# Patient Record
Sex: MALE | Race: WHITE
[De-identification: names, ages, dates, MRNs, and addresses within clinical notes are randomized per-mention and may not be internally consistent; named-entity substitution may affect disease eponyms.]

---

## 2017-06-29 NOTE — OR
DATE:  06/29/2017

 

PROCEDURE:  Total colonoscopy.

 

INSTRUMENT USED:  CF-H180AL Olympus video colonoscope.

 

PREMEDICATIONS:  Fentanyl 150 mcg intravenous, Versed 4 mg intravenous.  Nasal

O2 cannula.  The procedure was done under pulse oximetry, BP recording, and

cardiac monitor.

 

INDICATION:  Screening colonoscopic examination is done for detection of any

polypoid lesions and removal, endoscopic hemostasis therapy if needed.

 

DESCRIPTION OF PROCEDURE:  Initial rectal exam was unremarkable.  Rigid anoscopy

was normal.  The colonoscope was passed with ease up to the ileocecal area,

photographs were taken of the normal-appearing cecum, identified by double-

bulged ileocecal folds.  No bleeding was noted from any of the visualized areas

at the commencement of the examination.  No stricture.  No vascular ectasia.  No

large isolated ulcerations seen.  No evidence of diffuse inflammatory bowel

disease in the form of friability, contact bleeding, or ulcerations.  No polyp

or tumor mass identified.  Probing the proximal sides of folds and flexures,

using adequate distention and clearing up the stool material, withdrawal of the

scope was made, cecum to rectum time over 6 minutes.  No bleeding was noted from

any of the visualized areas at the completion of examination.

 

IMPRESSION:  Normal study.

 

The patient tolerated the procedure well.

 

DD:  06/29/2017 08:23:30

DT:  06/29/2017 09:12:18

[facility]

Job #:  [id number]

## 2020-01-06 ENCOUNTER — HOSPITAL ENCOUNTER (EMERGENCY)
Dept: HOSPITAL 43 - DL.ED | Age: 60
Discharge: HOME | End: 2020-01-06
Payer: SELF-PAY

## 2020-01-06 VITALS — DIASTOLIC BLOOD PRESSURE: 77 MMHG | HEART RATE: 87 BPM | SYSTOLIC BLOOD PRESSURE: 148 MMHG

## 2020-01-06 DIAGNOSIS — Z91.09: ICD-10-CM

## 2020-01-06 DIAGNOSIS — I10: ICD-10-CM

## 2020-01-06 DIAGNOSIS — E66.9: ICD-10-CM

## 2020-01-06 DIAGNOSIS — E03.9: ICD-10-CM

## 2020-01-06 DIAGNOSIS — Z79.899: ICD-10-CM

## 2020-01-06 DIAGNOSIS — F17.210: ICD-10-CM

## 2020-01-06 DIAGNOSIS — B34.9: ICD-10-CM

## 2020-01-06 DIAGNOSIS — R07.89: Primary | ICD-10-CM

## 2020-01-06 LAB
ANION GAP SERPL CALC-SCNC: 14.8 MMOL/L
CHLORIDE SERPL-SCNC: 98 MMOL/L (ref 101–111)
SODIUM SERPL-SCNC: 135 MMOL/L (ref 135–145)

## 2020-01-06 NOTE — CR
EXAMINATION: Chest 1V Frontal  SEX: Male   AGE: 59 years

 

CLINICAL HISTORY: 59-year-old male complaining of CHEST PAIN.

 

 

INTERPRETATION: External cardiac monitor leads.

 

1.  Normal cardiac silhouette. 

2.  No pulmonary vascular congestion, cephalization of vascular flow, alveolar

edema or dependent pleural effusion.

 

3.  No lung mass, hilar lymphadenopathy or focal lobar pneumonia.

 

4.  No pneumothorax or pneumomediastinum.

 

CONCLUSION:

  No sign of heart failure, lobar pneumonia or other abnormality.

## 2020-01-06 NOTE — EDM.PDOC
ED HPI GENERAL MEDICAL PROBLEM





- General


Chief Complaint: Chest Pain


Stated Complaint: CHEST FEELS HEAVY/LIGHTHEADED


Time Seen by Provider: 01/06/20 13:26


Source of Information: Reports: Patient, RN, RN Notes Reviewed


History Limitations: Reports: No Limitations





- History of Present Illness


INITIAL COMMENTS - FREE TEXT/NARRATIVE: 


Pt presents to ER with c/o lightheadedness that began at 1130HRS today. Pt 

states he hasn't felt well in general for about 3 or 4 days with "flu-like" 

symptoms. Pt admits to a heaviness in his chest for the past 3 days. Denies 

cough, shortness of breath, edema, or palpitations. Denies any Hx of CAD/MI.





Onset: Gradual


Duration: Constant, Resolved Prior to Arrival


Location: Reports: Chest, Generalized


Quality: Reports: Ache


Severity: Moderate


Improves with: Reports: None


Worsens with: Reports: None


Associated Symptoms: Reports: No Other Symptoms





- Related Data


 Allergies











Allergy/AdvReac Type Severity Reaction Status Date / Time


 


bee sting Allergy  ANAPHYLAXIS Uncoded 01/06/20 13:13











Home Meds: 


 Home Meds





Chlorthalidone 1 tab PO DAILY 06/28/17 [History]


Levothyroxine Sodium 1 tab PO DAILY 06/28/17 [History]


amLODIPine Besylate [Amlodipine Besylate] 1 tab PO DAILY 06/28/17 [History]


cloNIDine HCL [Clonidine HCl] 1 tab PO DAILY 06/28/17 [History]


Potassium Chloride [Klor-Con 10] 10 meq PO DAILY 01/06/20 [History]











Past Medical History


HEENT History: Reports: Impaired Vision, Other (See Below)


Other HEENT History: WEARS CORRECTIVE LENS


Cardiovascular History: Reports: Hypertension


Respiratory History: Reports: None


Gastrointestinal History: Reports: None


Genitourinary History: Reports: Other (See Below)


Other Genitourinary History: MICROSCOPIC HEMATURIA


Musculoskeletal History: Reports: None


Neurological History: Reports: None


Psychiatric History: Reports: None


Endocrine/Metabolic History: Reports: Hypothyroidism, Obesity/BMI 30+


Hematologic History: Reports: None


Immunologic History: Reports: None


Oncologic (Cancer) History: Reports: None


Dermatologic History: Reports: Other (See Below)


Other Dermatologic History: sun allergy...breaks out in the sun. on arms





- Infectious Disease History


Infectious Disease History: Reports: Chicken Pox, Measles, Mumps





- Past Surgical History


Male  Surgical History: Reports: Other (See Below)





Social & Family History





- Family History


Family Medical History: Noncontributory





- Tobacco Use


Smoking Status *Q: Heavy Tobacco Smoker


Years of Tobacco use: 39


Packs/Tins Daily: 1





- Caffeine Use


Caffeine Use: Reports: Coffee





- Alcohol Use


Days Per Week of Alcohol Use: 2


Number of Drinks Per Day: 5


Total Drinks Per Week: 10





- Recreational Drug Use


Recreational Drug Use: No





- Living Situation & Occupation


Occupation: Employed





ED ROS GENERAL





- Review of Systems


Review Of Systems: Comprehensive ROS is negative, except as noted in HPI.





ED EXAM, GENERAL





- Physical Exam


Exam: See Below


Exam Limited By: No Limitations


General Appearance: Alert, WD/WN, No Apparent Distress


Eye Exam: Bilateral Eye: Normal Inspection


Nose: Normal Inspection, Normal Mucosa, No Blood


Throat/Mouth: Normal Inspection, Normal Lips, Normal Teeth, Normal Gums, Normal 

Oropharynx, Normal Voice, No Airway Compromise


Head: Atraumatic, Normocephalic


Neck: Normal Inspection, Supple, Non-Tender, Full Range of Motion


Respiratory/Chest: No Respiratory Distress, Lungs Clear, Normal Breath Sounds, 

No Accessory Muscle Use, Chest Non-Tender


Cardiovascular: Normal Peripheral Pulses, Regular Rate, Rhythm, No Edema, No 

Gallop, No JVD, No Murmur, No Rub


GI/Abdominal: Normal Bowel Sounds, Soft, Non-Tender, No Organomegaly, No 

Distention, No Abnormal Bruit, No Mass


Back Exam: Normal Inspection


Extremities: Normal Inspection


Neurological: Alert, Oriented, Normal Cognition, No Motor/Sensory Deficits


Psychiatric: Normal Mood


Skin Exam: Warm, Dry, Intact, Normal Color, No Rash





EKG INTERPRETATION


EKG Date: 01/06/20


Time: 13:17


Rhythm: Other (SR)


Rate (Beats/Min): 89


Axis: Normal


P-Wave: Present


QRS: Wide (borderline IVCD)


ST-T: Normal


QT: Normal


Comparison: NA - No Prior EKG





Course





- Vital Signs


Last Recorded V/S: 


 Last Vital Signs











Temp  99.2 F   01/06/20 13:17


 


Pulse  87   01/06/20 13:17


 


Resp  18   01/06/20 13:17


 


BP  148/77 H  01/06/20 13:17


 


Pulse Ox  98   01/06/20 13:17














- Orders/Labs/Meds


Orders: 


 Active Orders 24 hr











 Category Date Time Status


 


 EKG 12 Lead [EKG Documentation Completion] [] STAT Care  01/06/20 13:23 

Active


 


 Peripheral IV Care [RC] .AS DIRECTED Care  01/06/20 13:24 Active


 


 CULTURE STREP A CONFIRMATION [] Stat Lab  01/06/20 13:25 Results


 


 STREP SCRN A RAPID W CULT CONF [] Stat Lab  01/06/20 13:25 Results


 


 Sodium Chloride 0.9% [Saline Flush] Med  01/06/20 13:23 Active





 10 ml FLUSH ASDIRECTED PRN   


 


 Peripheral IV Insertion Adult [OM.PC] Stat Oth  01/06/20 13:23 Ordered








 Medication Orders





Sodium Chloride (Saline Flush)  10 ml FLUSH ASDIRECTED PRN


   PRN Reason: Keep Vein Open


   Last Admin: 01/06/20 13:37  Dose: 10 ml








Labs: 


 Laboratory Tests











  01/06/20 01/06/20 Range/Units





  13:18 13:18 


 


WBC  9.3   (5.0-10.0)  10^3/uL


 


RBC  5.69   (4.6-6.2)  10^6/uL


 


Hgb  17.8   (14.0-18.0)  g/dL


 


Hct  49.6   (40.0-54.0)  %


 


MCV  87.2   ()  fL


 


MCH  31.3   (27.0-34.0)  pg


 


MCHC  35.9 H   (33.0-35.0)  g/dL


 


Plt Count  260   (150-450)  10^3/uL


 


Neut % (Auto)  54.5   (42.2-75.2)  %


 


Lymph % (Auto)  31.8   (20.5-50.1)  %


 


Mono % (Auto)  9.4 H   (2-8)  %


 


Eos % (Auto)  3.8 H   (1.0-3.0)  %


 


Baso % (Auto)  0.5   (0.0-1.0)  %


 


Sodium   135  (135-145)  mmol/L


 


Potassium   3.8  (3.6-5.0)  mmol/L


 


Chloride   98 L  (101-111)  mmol/L


 


Carbon Dioxide   26.0  (21.0-31.0)  mmol/L


 


Anion Gap   14.8  


 


BUN   18  (7-18)  mg/dL


 


Creatinine   0.8  (0.6-1.3)  mg/dL


 


Est Cr Clr Drug Dosing   101.88  mL/min


 


Estimated GFR (MDRD)   > 60  


 


BUN/Creatinine Ratio   22.50  


 


Glucose   120 H  ()  mg/dL


 


Calcium   9.6  (8.4-10.2)  mg/dl


 


Total Bilirubin   1.2 H  (0.2-1.0)  mg/dL


 


AST   36  (10-42)  IU/L


 


ALT   33  (10-60)  IU/L


 


Alkaline Phosphatase   70  ()  IU/L


 


Troponin I   < 0.02  (0.00-0.02)  ng/ml


 


Total Protein   8.1  (6.7-8.2)  g/dl


 


Albumin   4.7  (3.2-5.5)  g/dl


 


Globulin   3.4  


 


Albumin/Globulin Ratio   1.38  


 


Lipase   41  (22-51)  U/L











Meds: 


Medications











Generic Name Dose Route Start Last Admin





  Trade Name Freq  PRN Reason Stop Dose Admin


 


Sodium Chloride  10 ml  01/06/20 13:23  01/06/20 13:37





  Saline Flush  FLUSH   10 ml





  ASDIRECTED PRN   Administration





  Keep Vein Open   





     





     





     














- Radiology Interpretation


Free Text/Narrative:: 


Chest X-ray: no acute process, see Rad. report.





Departure





- Departure


Time of Disposition: 14:59


Disposition: Home, Self-Care 01


Condition: Good


Clinical Impression: 


 Atypical chest pain, Acute viral syndrome





Instructions:  Nonspecific Chest Pain, Viral Illness, Adult


Forms:  ED Department Discharge


Additional Instructions: 


Rest, drink plenty of water.


Follow up in clinic with your primary doctor this week to consider a cardiac 

stress test.


Return to the ER if you develop chest pain.





Sepsis Event Note





- Evaluation


Sepsis Screening Result: No Definite Risk





- Focused Exam


Vital Signs: 


 Vital Signs











  Temp Pulse Resp BP Pulse Ox


 


 01/06/20 13:17  99.2 F  87  18  148/77 H  98











Date Exam was Performed: 01/06/20


Time Exam was Performed: 15:20





- My Orders


Last 24 Hours: 


My Active Orders





01/06/20 13:23


EKG 12 Lead [EKG Documentation Completion] [RC] STAT 


Sodium Chloride 0.9% [Saline Flush]   10 ml FLUSH ASDIRECTED PRN 


Peripheral IV Insertion Adult [OM.PC] Stat 





01/06/20 13:24


Peripheral IV Care [RC] .AS DIRECTED 





01/06/20 13:25


CULTURE STREP A CONFIRMATION [] Stat 


STREP SCRN A RAPID W CULT CONF [RM] Stat 














- Assessment/Plan


Last 24 Hours: 


My Active Orders





01/06/20 13:23


EKG 12 Lead [EKG Documentation Completion] [RC] STAT 


Sodium Chloride 0.9% [Saline Flush]   10 ml FLUSH ASDIRECTED PRN 


Peripheral IV Insertion Adult [OM.PC] Stat 





01/06/20 13:24


Peripheral IV Care [RC] .AS DIRECTED 





01/06/20 13:25


CULTURE STREP A CONFIRMATION [RM] Stat 


STREP SCRN A RAPID W CULT CONF [RM] Stat

## 2025-03-28 ENCOUNTER — HOSPITAL ENCOUNTER (EMERGENCY)
Dept: HOSPITAL 43 - DL.ED | Age: 65
LOS: 1 days | Discharge: NURSING, REHAB OR LONG-TERM CARE | End: 2025-03-29
Payer: SELF-PAY

## 2025-03-28 VITALS — DIASTOLIC BLOOD PRESSURE: 73 MMHG | HEART RATE: 70 BPM | SYSTOLIC BLOOD PRESSURE: 143 MMHG

## 2025-03-28 DIAGNOSIS — E03.9: ICD-10-CM

## 2025-03-28 DIAGNOSIS — Z79.899: ICD-10-CM

## 2025-03-28 DIAGNOSIS — Z91.030: ICD-10-CM

## 2025-03-28 DIAGNOSIS — I21.4: Primary | ICD-10-CM

## 2025-03-28 DIAGNOSIS — F17.210: ICD-10-CM

## 2025-03-28 DIAGNOSIS — E66.9: ICD-10-CM

## 2025-03-28 DIAGNOSIS — I44.7: ICD-10-CM

## 2025-03-28 DIAGNOSIS — I10: ICD-10-CM

## 2025-03-28 DIAGNOSIS — Z79.890: ICD-10-CM

## 2025-03-28 LAB
ALBUMIN SERPL-MCNC: 4.4 G/DL (ref 3.4–5)
ALBUMIN/GLOB SERPL: 1 {RATIO}
ALP SERPL-CCNC: 98 U/L (ref 46–116)
ALT SERPL-CCNC: 50 U/L (ref 16–63)
ANION GAP SERPL CALC-SCNC: 14.5 MEQ/L (ref 7–13)
APPEARANCE UR: CLEAR
APTT PPP: 28.3 SEC (ref 22–34)
AST SERPL-CCNC: 24 U/L (ref 15–37)
BACTERIA URNS QL MICRO: (no result) /HPF
BASOPHILS NFR BLD AUTO: 0.4 % (ref 0–1)
BILIRUB SERPL-MCNC: 0.4 MG/DL (ref 0.2–1)
BILIRUB UR STRIP-MCNC: NEGATIVE MG/DL
BUN SERPL-MCNC: 12 MG/DL (ref 7–18)
BUN/CREAT SERPL: 14
CALCIUM SERPL-MCNC: 9.6 MG/DL (ref 8.5–10.1)
CHLORIDE SERPL-SCNC: 100 MMOL/L (ref 98–107)
CHOLEST SERPL-MCNC: 195 MG/DL (ref 0–199)
CO2 SERPL-SCNC: 25 MMOL/L (ref 21–32)
COLOR UR: YELLOW
CREAT CL 24H UR+SERPL-VRATE: 87.92 ML/MIN
CREAT SERPL-MCNC: 0.86 MG/DL (ref 0.7–1.3)
EGFRCR SERPLBLD CKD-EPI 2021: 97 ML/MIN (ref 60–?)
EOSINOPHIL NFR BLD AUTO: 4.6 % (ref 1–3)
EPI CELLS #/AREA URNS HPF: (no result) /HPF
GLOBULIN SER-MCNC: 4.3 G/DL
GLUCOSE SERPL-MCNC: 114 MG/DL (ref 70–99)
GLUCOSE UR STRIP-MCNC: NEGATIVE MG/DL
HCT VFR BLD AUTO: 50.8 % (ref 40–54)
HDLC SERPL-MCNC: 44 MG/DL (ref 40–59)
HGB BLD-MCNC: 17.3 G/DL (ref 14–18)
INR PPP: 0.9 (ref 0.9–1.2)
KETONES UR STRIP-MCNC: NEGATIVE MG/DL
LDLC SERPL CALC-MCNC: 110 MG/DL (ref 0–100)
LYMPHOCYTES NFR BLD AUTO: 31.6 % (ref 20.5–50.1)
MAGNESIUM SERPL-MCNC: 2.2 MG/DL (ref 1.8–2.4)
MCH RBC QN AUTO: 30.5 PG (ref 27–34)
MCHC RBC AUTO-ENTMCNC: 34.1 G/DL (ref 33–35)
MCHC RBC AUTO-ENTMCNC: 89.6 FL (ref 80–100)
MONOCYTES NFR BLD AUTO: 11.1 % (ref 2–8)
MUCOUS THREADS URNS QL MICRO: (no result) /LPF
NEUTROPHILS NFR BLD AUTO: 52.3 % (ref 42.2–75.2)
NITRITE UR QL: NEGATIVE
PH UR STRIP: 5.5 [PH] (ref 5–9)
PLATELET # BLD AUTO: 267 10^3/UL (ref 150–450)
POTASSIUM SERPL-SCNC: 3.5 MMOL/L (ref 3.5–5.1)
PROT SERPL-MCNC: 8.7 G/DL (ref 6.4–8.2)
PROT UR STRIP-MCNC: NEGATIVE MG/DL
PROTHROMBIN TIME: 9.9 SEC (ref 9–12)
RBC # BLD AUTO: 5.67 10^6/UL (ref 4.6–6.2)
RBC # URNS HPF: (no result) /HPF (ref 0–5)
RBC UR QL: (no result)
SODIUM SERPL-SCNC: 136 MMOL/L (ref 136–145)
SP GR UR STRIP: <= 1.005 (ref 1–1.03)
TRIGL SERPL-MCNC: 205 MG/DL (ref 0–149)
TSH SERPL DL<=0.005 MIU/L-ACNC: 16.95 UIU/ML (ref 0.36–3.74)
UROBILINOGEN UR STRIP-ACNC: 0.2 MG/DL (ref 0.2–1)
WBC # BLD AUTO: 13.5 10^3/UL (ref 5–10)
WBC UR QL: (no result) /HPF

## 2025-03-28 PROCEDURE — 85610 PROTHROMBIN TIME: CPT

## 2025-03-28 PROCEDURE — 36415 COLL VENOUS BLD VENIPUNCTURE: CPT

## 2025-03-28 PROCEDURE — 85025 COMPLETE CBC W/AUTO DIFF WBC: CPT

## 2025-03-28 PROCEDURE — 93010 ELECTROCARDIOGRAM REPORT: CPT

## 2025-03-28 PROCEDURE — 80061 LIPID PANEL: CPT

## 2025-03-28 PROCEDURE — 80053 COMPREHEN METABOLIC PANEL: CPT

## 2025-03-28 PROCEDURE — 71045 X-RAY EXAM CHEST 1 VIEW: CPT

## 2025-03-28 PROCEDURE — 84484 ASSAY OF TROPONIN QUANT: CPT

## 2025-03-28 PROCEDURE — 80307 DRUG TEST PRSMV CHEM ANLYZR: CPT

## 2025-03-28 PROCEDURE — 85730 THROMBOPLASTIN TIME PARTIAL: CPT

## 2025-03-28 PROCEDURE — 93005 ELECTROCARDIOGRAM TRACING: CPT

## 2025-03-28 PROCEDURE — 84443 ASSAY THYROID STIM HORMONE: CPT

## 2025-03-28 PROCEDURE — 83735 ASSAY OF MAGNESIUM: CPT

## 2025-03-28 PROCEDURE — 99285 EMERGENCY DEPT VISIT HI MDM: CPT

## 2025-03-28 PROCEDURE — 81001 URINALYSIS AUTO W/SCOPE: CPT

## 2025-03-28 PROCEDURE — 96365 THER/PROPH/DIAG IV INF INIT: CPT

## 2025-03-28 RX ADMIN — HEPARIN SODIUM ONE UNITS: 5000 INJECTION, SOLUTION INTRAVENOUS; SUBCUTANEOUS at 23:07

## 2025-03-28 RX ADMIN — NITROGLYCERIN ONE GM: 20 OINTMENT TOPICAL at 23:40

## 2025-03-28 RX ADMIN — HEPARIN SODIUM SCH MLS/HR: 5000 INJECTION, SOLUTION INTRAVENOUS at 23:04

## 2025-03-29 ENCOUNTER — HOSPITAL ENCOUNTER (EMERGENCY)
Dept: HOSPITAL 43 - DL.ED | Age: 65
Discharge: OTHER INSTITUTION | End: 2025-03-29
Payer: SELF-PAY

## 2025-03-29 VITALS — DIASTOLIC BLOOD PRESSURE: 93 MMHG | HEART RATE: 91 BPM | SYSTOLIC BLOOD PRESSURE: 138 MMHG

## 2025-03-29 DIAGNOSIS — I21.4: Primary | ICD-10-CM

## 2025-03-29 DIAGNOSIS — F17.210: ICD-10-CM

## 2025-03-29 DIAGNOSIS — Z79.890: ICD-10-CM

## 2025-03-29 DIAGNOSIS — I24.9: ICD-10-CM

## 2025-03-29 DIAGNOSIS — I10: ICD-10-CM

## 2025-03-29 DIAGNOSIS — E66.9: ICD-10-CM

## 2025-03-29 DIAGNOSIS — Z79.899: ICD-10-CM

## 2025-03-29 DIAGNOSIS — Z91.030: ICD-10-CM

## 2025-03-29 DIAGNOSIS — E03.9: ICD-10-CM

## 2025-03-29 LAB
APTT PPP: 31.3 SEC (ref 22–34)
INR PPP: 1 (ref 0.9–1.2)
PROTHROMBIN TIME: 10 SEC (ref 9–12)

## 2025-03-29 PROCEDURE — 36415 COLL VENOUS BLD VENIPUNCTURE: CPT

## 2025-03-29 PROCEDURE — 85610 PROTHROMBIN TIME: CPT

## 2025-03-29 PROCEDURE — 96366 THER/PROPH/DIAG IV INF ADDON: CPT

## 2025-03-29 PROCEDURE — 93005 ELECTROCARDIOGRAM TRACING: CPT

## 2025-03-29 PROCEDURE — 85730 THROMBOPLASTIN TIME PARTIAL: CPT

## 2025-03-29 PROCEDURE — 99285 EMERGENCY DEPT VISIT HI MDM: CPT

## 2025-03-29 PROCEDURE — 84484 ASSAY OF TROPONIN QUANT: CPT

## 2025-03-29 PROCEDURE — 96365 THER/PROPH/DIAG IV INF INIT: CPT

## 2025-03-29 PROCEDURE — 93010 ELECTROCARDIOGRAM REPORT: CPT

## 2025-03-29 PROCEDURE — 99291 CRITICAL CARE FIRST HOUR: CPT

## 2025-03-29 RX ADMIN — HEPARIN SODIUM SCH MLS/HR: 5000 INJECTION, SOLUTION INTRAVENOUS at 02:00

## 2025-03-29 RX ADMIN — HEPARIN SODIUM ONE UNITS: 5000 INJECTION, SOLUTION INTRAVENOUS; SUBCUTANEOUS at 06:54

## 2025-04-19 ENCOUNTER — HOSPITAL ENCOUNTER (EMERGENCY)
Dept: HOSPITAL 43 - DL.ED | Age: 65
LOS: 1 days | Discharge: NURSING, REHAB OR LONG-TERM CARE | End: 2025-04-20
Payer: SELF-PAY

## 2025-04-19 VITALS — SYSTOLIC BLOOD PRESSURE: 121 MMHG | HEART RATE: 79 BPM | DIASTOLIC BLOOD PRESSURE: 68 MMHG

## 2025-04-19 DIAGNOSIS — E66.9: ICD-10-CM

## 2025-04-19 DIAGNOSIS — I24.9: Primary | ICD-10-CM

## 2025-04-19 DIAGNOSIS — Z91.030: ICD-10-CM

## 2025-04-19 DIAGNOSIS — Z79.890: ICD-10-CM

## 2025-04-19 DIAGNOSIS — E03.9: ICD-10-CM

## 2025-04-19 DIAGNOSIS — I10: ICD-10-CM

## 2025-04-19 LAB
ALBUMIN SERPL-MCNC: 3.8 G/DL (ref 3.4–5)
ANION GAP SERPL CALC-SCNC: 12.7 MEQ/L (ref 7–13)
APTT PPP: 27.3 SEC (ref 22–34)
BASOPHILS NFR BLD AUTO: 0.3 % (ref 0–1)
BILIRUB SERPL-MCNC: 0.4 MG/DL (ref 0.2–1)
BUN/CREAT SERPL: 20.2
CALCIUM SERPL-MCNC: 9.4 MG/DL (ref 8.5–10.1)
CREAT CL 24H UR+SERPL-VRATE: 90.01 ML/MIN
CREAT SERPL-MCNC: 0.84 MG/DL (ref 0.7–1.3)
EOSINOPHIL NFR BLD AUTO: 4.4 % (ref 1–3)
GLOBULIN SER-MCNC: 3.8 G/DL
HGB BLD-MCNC: 16.8 G/DL (ref 14–18)
LYMPHOCYTES NFR BLD AUTO: 31.7 % (ref 20.5–50.1)
MCH RBC QN AUTO: 31.2 PG (ref 27–34)
MCHC RBC AUTO-ENTMCNC: 89.2 FL (ref 80–100)
NEUTROPHILS NFR BLD AUTO: 54.6 % (ref 42.2–75.2)
PLATELET # BLD AUTO: 255 10^3/UL (ref 150–450)
POTASSIUM SERPL-SCNC: 3.7 MMOL/L (ref 3.5–5.1)
PROT SERPL-MCNC: 7.6 G/DL (ref 6.4–8.2)
PROTHROMBIN TIME: 10.1 SEC (ref 9–12)
RBC # BLD AUTO: 5.38 10^6/UL (ref 4.6–6.2)

## 2025-04-19 PROCEDURE — 93005 ELECTROCARDIOGRAM TRACING: CPT

## 2025-04-19 PROCEDURE — 85730 THROMBOPLASTIN TIME PARTIAL: CPT

## 2025-04-19 PROCEDURE — 80053 COMPREHEN METABOLIC PANEL: CPT

## 2025-04-19 PROCEDURE — 36415 COLL VENOUS BLD VENIPUNCTURE: CPT

## 2025-04-19 PROCEDURE — 99285 EMERGENCY DEPT VISIT HI MDM: CPT

## 2025-04-19 PROCEDURE — 96366 THER/PROPH/DIAG IV INF ADDON: CPT

## 2025-04-19 PROCEDURE — 93010 ELECTROCARDIOGRAM REPORT: CPT

## 2025-04-19 PROCEDURE — 84484 ASSAY OF TROPONIN QUANT: CPT

## 2025-04-19 PROCEDURE — 71045 X-RAY EXAM CHEST 1 VIEW: CPT

## 2025-04-19 PROCEDURE — 85379 FIBRIN DEGRADATION QUANT: CPT

## 2025-04-19 PROCEDURE — 85025 COMPLETE CBC W/AUTO DIFF WBC: CPT

## 2025-04-19 PROCEDURE — 96376 TX/PRO/DX INJ SAME DRUG ADON: CPT

## 2025-04-19 PROCEDURE — 83735 ASSAY OF MAGNESIUM: CPT

## 2025-04-19 PROCEDURE — 85610 PROTHROMBIN TIME: CPT

## 2025-04-19 PROCEDURE — 96365 THER/PROPH/DIAG IV INF INIT: CPT

## 2025-04-19 RX ADMIN — HEPARIN SODIUM SCH MLS/HR: 5000 INJECTION, SOLUTION INTRAVENOUS at 22:04

## 2025-04-19 RX ADMIN — METOROPROLOL TARTRATE ONE: 5 INJECTION, SOLUTION INTRAVENOUS at 23:48

## 2025-04-19 RX ADMIN — HEPARIN SODIUM ONE UNITS: 5000 INJECTION, SOLUTION INTRAVENOUS; SUBCUTANEOUS at 21:54

## 2025-04-19 RX ADMIN — NITROGLYCERIN ONE MG: 0.4 TABLET SUBLINGUAL at 21:55
